# Patient Record
Sex: MALE | Race: WHITE | Employment: FULL TIME | URBAN - METROPOLITAN AREA
[De-identification: names, ages, dates, MRNs, and addresses within clinical notes are randomized per-mention and may not be internally consistent; named-entity substitution may affect disease eponyms.]

---

## 2018-12-17 ENCOUNTER — HOSPITAL ENCOUNTER (EMERGENCY)
Age: 28
Discharge: HOME | End: 2018-12-17
Payer: COMMERCIAL

## 2018-12-17 VITALS
RESPIRATION RATE: 16 BRPM | HEIGHT: 72 IN | TEMPERATURE: 97.9 F | SYSTOLIC BLOOD PRESSURE: 140 MMHG | BODY MASS INDEX: 27.09 KG/M2 | OXYGEN SATURATION: 99 % | DIASTOLIC BLOOD PRESSURE: 86 MMHG | HEART RATE: 87 BPM | WEIGHT: 200 LBS

## 2018-12-17 DIAGNOSIS — T15.92XA FOREIGN BODY OF LEFT EXTERNAL EYE, INITIAL ENCOUNTER: Primary | ICD-10-CM

## 2018-12-17 PROCEDURE — 6370000000 HC RX 637 (ALT 250 FOR IP): Performed by: PHYSICIAN ASSISTANT

## 2018-12-17 PROCEDURE — 90715 TDAP VACCINE 7 YRS/> IM: CPT | Performed by: PHYSICIAN ASSISTANT

## 2018-12-17 PROCEDURE — 99283 EMERGENCY DEPT VISIT LOW MDM: CPT

## 2018-12-17 PROCEDURE — 4500000027

## 2018-12-17 PROCEDURE — 6360000002 HC RX W HCPCS: Performed by: PHYSICIAN ASSISTANT

## 2018-12-17 PROCEDURE — 90471 IMMUNIZATION ADMIN: CPT | Performed by: PHYSICIAN ASSISTANT

## 2018-12-17 RX ORDER — ERYTHROMYCIN 5 MG/G
OINTMENT OPHTHALMIC
Qty: 1 TUBE | Refills: 0 | Status: SHIPPED | OUTPATIENT
Start: 2018-12-17

## 2018-12-17 RX ORDER — TETRACAINE HYDROCHLORIDE 5 MG/ML
2 SOLUTION OPHTHALMIC SEE ADMIN INSTRUCTIONS
Status: DISCONTINUED | OUTPATIENT
Start: 2018-12-17 | End: 2018-12-17 | Stop reason: HOSPADM

## 2018-12-17 RX ORDER — HYDROCODONE BITARTRATE AND ACETAMINOPHEN 5; 325 MG/1; MG/1
1 TABLET ORAL EVERY 4 HOURS PRN
Qty: 6 TABLET | Refills: 0 | Status: SHIPPED | OUTPATIENT
Start: 2018-12-17 | End: 2018-12-24

## 2018-12-17 RX ADMIN — TETANUS TOXOID, REDUCED DIPHTHERIA TOXOID AND ACELLULAR PERTUSSIS VACCINE, ADSORBED 0.5 ML: 5; 2.5; 8; 8; 2.5 SUSPENSION INTRAMUSCULAR at 18:06

## 2018-12-17 RX ADMIN — FLUORESCEIN SODIUM 1 MG: 1 STRIP OPHTHALMIC at 18:06

## 2018-12-17 RX ADMIN — TETRACAINE HYDROCHLORIDE 2 DROP: 25 LIQUID OPHTHALMIC at 18:06

## 2018-12-17 ASSESSMENT — VISUAL ACUITY
OU: 20/25
OD: 20/25
OS: 20/25

## 2018-12-17 NOTE — LETTER
[facility] Emergency Department  [facility] [address] [address]  Phone: [phone]  Fax: [phone]               December 17, 2018    Patient: Vita Ayala   YOB: 1990   Date of Visit: 12/17/2018       To Whom It May Concern:    Sarita Mc was seen and treated in our emergency department on 12/17/2018. He may return to work on 12/19/2018.       Sincerely,       Trudi Matute RN         Signature:__________________________________

## 2019-12-16 ENCOUNTER — HOSPITAL ENCOUNTER (EMERGENCY)
Age: 29
Discharge: HOME | End: 2019-12-16
Attending: EMERGENCY MEDICINE
Payer: COMMERCIAL

## 2019-12-16 VITALS
SYSTOLIC BLOOD PRESSURE: 142 MMHG | HEART RATE: 81 BPM | RESPIRATION RATE: 20 BRPM | WEIGHT: 196 LBS | BODY MASS INDEX: 26.55 KG/M2 | TEMPERATURE: 98.1 F | DIASTOLIC BLOOD PRESSURE: 84 MMHG | OXYGEN SATURATION: 98 % | HEIGHT: 72 IN

## 2019-12-16 DIAGNOSIS — G47.8 DIFFICULTY WAKING: Primary | ICD-10-CM

## 2019-12-16 LAB
ALBUMIN SERPL-MCNC: 4.1 GM/DL (ref 3.4–5)
ALP BLD-CCNC: 73 IU/L (ref 40–129)
ALT SERPL-CCNC: 13 U/L (ref 10–40)
ANION GAP SERPL CALCULATED.3IONS-SCNC: 10 MMOL/L (ref 4–16)
AST SERPL-CCNC: 18 IU/L (ref 15–37)
BASOPHILS ABSOLUTE: 0 K/CU MM
BASOPHILS RELATIVE PERCENT: 0.3 % (ref 0–1)
BILIRUB SERPL-MCNC: 0.3 MG/DL (ref 0–1)
BUN BLDV-MCNC: 11 MG/DL (ref 6–23)
CALCIUM SERPL-MCNC: 9.2 MG/DL (ref 8.3–10.6)
CHLORIDE BLD-SCNC: 104 MMOL/L (ref 99–110)
CO2: 26 MMOL/L (ref 21–32)
CREAT SERPL-MCNC: 0.8 MG/DL (ref 0.9–1.3)
DIFFERENTIAL TYPE: ABNORMAL
EOSINOPHILS ABSOLUTE: 0.2 K/CU MM
EOSINOPHILS RELATIVE PERCENT: 1.8 % (ref 0–3)
GFR AFRICAN AMERICAN: >60 ML/MIN/1.73M2
GFR NON-AFRICAN AMERICAN: >60 ML/MIN/1.73M2
GLUCOSE BLD-MCNC: 86 MG/DL (ref 70–99)
HCT VFR BLD CALC: 46.5 % (ref 42–52)
HEMOGLOBIN: 14.8 GM/DL (ref 13.5–18)
IMMATURE NEUTROPHIL %: 0.6 % (ref 0–0.43)
LIPASE: 30 IU/L (ref 13–60)
LYMPHOCYTES ABSOLUTE: 1.9 K/CU MM
LYMPHOCYTES RELATIVE PERCENT: 16 % (ref 24–44)
MCH RBC QN AUTO: 31.3 PG (ref 27–31)
MCHC RBC AUTO-ENTMCNC: 31.8 % (ref 32–36)
MCV RBC AUTO: 98.3 FL (ref 78–100)
MONOCYTES ABSOLUTE: 0.9 K/CU MM
MONOCYTES RELATIVE PERCENT: 7.8 % (ref 0–4)
NUCLEATED RBC %: 0 %
PDW BLD-RTO: 12.7 % (ref 11.7–14.9)
PLATELET # BLD: 197 K/CU MM (ref 140–440)
PMV BLD AUTO: 10.7 FL (ref 7.5–11.1)
POTASSIUM SERPL-SCNC: 4.4 MMOL/L (ref 3.5–5.1)
RBC # BLD: 4.73 M/CU MM (ref 4.6–6.2)
SEGMENTED NEUTROPHILS ABSOLUTE COUNT: 8.8 K/CU MM
SEGMENTED NEUTROPHILS RELATIVE PERCENT: 73.5 % (ref 36–66)
SODIUM BLD-SCNC: 140 MMOL/L (ref 135–145)
TOTAL IMMATURE NEUTOROPHIL: 0.07 K/CU MM
TOTAL NUCLEATED RBC: 0 K/CU MM
TOTAL PROTEIN: 7.3 GM/DL (ref 6.4–8.2)
WBC # BLD: 11.9 K/CU MM (ref 4–10.5)

## 2019-12-16 PROCEDURE — 99284 EMERGENCY DEPT VISIT MOD MDM: CPT

## 2019-12-16 PROCEDURE — 85025 COMPLETE CBC W/AUTO DIFF WBC: CPT

## 2019-12-16 PROCEDURE — 80053 COMPREHEN METABOLIC PANEL: CPT

## 2019-12-16 PROCEDURE — 83690 ASSAY OF LIPASE: CPT

## 2019-12-16 PROCEDURE — 36415 COLL VENOUS BLD VENIPUNCTURE: CPT

## 2019-12-16 ASSESSMENT — PAIN DESCRIPTION - LOCATION: LOCATION: FOOT

## 2019-12-16 ASSESSMENT — PAIN DESCRIPTION - DESCRIPTORS: DESCRIPTORS: ACHING

## 2019-12-16 ASSESSMENT — PAIN DESCRIPTION - PAIN TYPE: TYPE: ACUTE PAIN

## 2019-12-16 ASSESSMENT — PAIN DESCRIPTION - ORIENTATION: ORIENTATION: LEFT

## 2019-12-16 ASSESSMENT — PAIN SCALES - GENERAL: PAINLEVEL_OUTOF10: 3

## 2022-01-02 ENCOUNTER — HOSPITAL ENCOUNTER (EMERGENCY)
Age: 32
Discharge: HOME | End: 2022-01-02
Payer: COMMERCIAL

## 2022-01-02 VITALS
HEART RATE: 100 BPM | RESPIRATION RATE: 16 BRPM | TEMPERATURE: 98.1 F | OXYGEN SATURATION: 98 % | SYSTOLIC BLOOD PRESSURE: 136 MMHG | DIASTOLIC BLOOD PRESSURE: 85 MMHG

## 2022-01-02 DIAGNOSIS — R21 RASH AND OTHER NONSPECIFIC SKIN ERUPTION: Primary | ICD-10-CM

## 2022-01-02 PROCEDURE — 99283 EMERGENCY DEPT VISIT LOW MDM: CPT

## 2022-01-02 PROCEDURE — 6370000000 HC RX 637 (ALT 250 FOR IP): Performed by: PHYSICIAN ASSISTANT

## 2022-01-02 RX ORDER — PREDNISONE 50 MG/1
50 TABLET ORAL DAILY
Qty: 5 TABLET | Refills: 0 | Status: SHIPPED | OUTPATIENT
Start: 2022-01-02 | End: 2022-01-07

## 2022-01-02 RX ADMIN — PREDNISONE 50 MG: 20 TABLET ORAL at 19:56

## 2022-01-03 NOTE — ED PROVIDER NOTES
eMERGENCY dEPARTMENT eNCOUnter        [address] [facility]    Chief Complaint   Patient presents with    Urticaria     no relief with benadryl       HPI    Tom Mann is a 36 y.o. male who presents with a rash. Onset was 3 days ago. The duration has been constant. The quality is pruritic, mild burning. The location is generalized. No pet or plant exposures, medications, chemicals in household, sick contacts, cosmetics. Associated symptoms: none. Aggravating factors: none. REVIEW OF SYSTEMS    Pulmonary: No difficulty breathing or chest tightness  Skin: + rash  ENT: No throat swelling or tongue swelling  General: No fevers or syncope    PAST MEDICAL & SURGICAL HISTORY    Past Medical History:   Diagnosis Date    Anxiety     Seizures ([address] 03/2014     Past Surgical History:   Procedure Laterality Date    APPENDECTOMY         CURRENT MEDICATIONS    Current Outpatient Rx   Medication Sig Dispense Refill    predniSONE (DELTASONE) 50 MG tablet Take 1 tablet by mouth daily for 5 days 5 tablet 0    erythromycin (ROMYCIN) 5 MG/GM ophthalmic ointment Apply 1 cm ribbon to the lower lid every 6 hours for 7 days 1 Tube 0    dicyclomine (BENTYL) 10 MG capsule Take 1 capsule by mouth 3 times daily As needed for abdominal pain 15 capsule 0    Magic Mouthwash (MIRACLE MOUTHWASH) Swish and spit 10 mLs 4 times daily as needed for Dental Pain Lidocaine, Maalox, Benadryl 240 mL 0       ALLERGIES    Allergies   Allergen Reactions    Sulfur Hives     Pt. States he just gets hives.        SOCIAL & FAMILY HISTORY    Social History     Socioeconomic History    Marital status: Single     Spouse name: None    Number of children: None    Years of education: None    Highest education level: None   Occupational History    None   Tobacco Use    Smoking status: Current Every Day Smoker     Packs/day: 1.00     Types: Cigarettes    Smokeless tobacco: Never Used   Substance and Sexual Activity    Alcohol use: No    Drug use: No    Sexual activity: None   Other Topics Concern    None   Social History Narrative    None     Social Determinants of Health     Financial Resource Strain:     Difficulty of Paying Living Expenses: Not on file   Food Insecurity:     Worried About Running Out of Food in the Last Year: Not on file    Kanu of Food in the Last Year: Not on file   Transportation Needs:     Lack of Transportation (Medical): Not on file    Lack of Transportation (Non-Medical): Not on file   Physical Activity:     Days of Exercise per Week: Not on file    Minutes of Exercise per Session: Not on file   Stress:     Feeling of Stress : Not on file   Social Connections:     Frequency of Communication with Friends and Family: Not on file    Frequency of Social Gatherings with Friends and Family: Not on file    Attends [employer] Services: Not on file    Active Member of [address] [place] [address] or Organizations: Not on file    Attends Club or Organization Meetings: Not on file    Marital Status: Not on file   Intimate Partner Violence:     Fear of Current or Ex-Partner: Not on file    Emotionally Abused: Not on file    Physically Abused: Not on file    Sexually Abused: Not on file   Housing Stability:     Unable to Pay for Housing in the Last Year: Not on file    Number of [place] in the Last Year: Not on file    Unstable Housing in the Last Year: Not on file     History reviewed. No pertinent family history. PHYSICAL EXAM    VITAL SIGNS: /85   Pulse 100   Temp 98.1 °F (36.7 °C) (Oral)   Resp 16   SpO2 98%   Constitutional:  Well developed, well nourished, no acute distress  HENT:  Atraumatic, no facial or lip swelling  ORAL EXAM:  No tongue swelling, airway patent  NECK:  Supple, No neck swelling  Respiratory:  No respiratory distress, normal breath sounds   Cardiovascular:  No JVD   GI: nondistended, normal bowel sounds, nontender, no organomegaly   Musculoskeletal:  No edema, no acute deformities. Integument: Patient has raised papular rash with occasional wheals concerning for allergic reaction. It spares the palms and the mucous membranes and most of the face but does involve around the bilateral cheeks and lid margins. There are no petechiae, pustules, purpura, induration, desquamation, bullae or discharge. ED COURSE & MEDICAL DECISION MAKING    Pertinent Labs reviewed and interpreted. (See chart for details)  See chart for details of medications given during the ED stay. Vitals:    01/02/22 1940   BP: 136/85   Pulse: 100   Resp: 16   Temp: 98.1 °F (36.7 °C)   TempSrc: Oral   SpO2: 98%     Patient has rash concerning for allergic reaction. It is quite pruritic. No clear source identified. Will start on prednisone. Recommended follow-up with PCP for recheck. I estimate there is LOW risk for SEVERE CELLULITIS, DEEP SPACE ABSCESS OR INFECTION, COMPARTMENT SYNDROME, NECROTIZING FASCIITIS, CLAIR'S GANGRENE, SOFT TISSUE FOREIGN BODY, OSTEOMYELITIS, thus I consider the discharge disposition reasonable. Nenita Pace and I have discussed the diagnosis and risks, and we agree with discharging home to follow-up with their primary doctor. We also discussed returning to the Emergency Department immediately if new or worsening symptoms occur. We have discussed the symptoms which are most concerning (e.g., changing or worsening pain, fever, numbness, weakness, cool or painful digits) that necessitate immediate return. I have independently evaluated this patient. Differential diagnosis: Vasculitis, bacterial skin infection, viral rash, systemic infectious rash, Anaphylaxis, Urticaria, other    FINAL IMPRESSION    Rash          (Please note that this note was completed with a voice recognition program.  Every attempt was made to edit the dictations, but inevitably there remain words that are mis-transcribed. Graham Keyes PA-C  01/02/22 1945

## 2022-03-17 ENCOUNTER — APPOINTMENT (OUTPATIENT)
Dept: CT IMAGING | Age: 32
End: 2022-03-17
Payer: COMMERCIAL

## 2022-03-17 ENCOUNTER — HOSPITAL ENCOUNTER (EMERGENCY)
Age: 32
Discharge: HOME | End: 2022-03-18
Payer: COMMERCIAL

## 2022-03-17 ENCOUNTER — APPOINTMENT (OUTPATIENT)
Dept: GENERAL RADIOLOGY | Age: 32
End: 2022-03-17
Payer: COMMERCIAL

## 2022-03-17 DIAGNOSIS — S82.392A OTHER CLOSED FRACTURE OF DISTAL END OF LEFT TIBIA, INITIAL ENCOUNTER: Primary | ICD-10-CM

## 2022-03-17 PROCEDURE — 6370000000 HC RX 637 (ALT 250 FOR IP): Performed by: PHYSICIAN ASSISTANT

## 2022-03-17 PROCEDURE — 96372 THER/PROPH/DIAG INJ SC/IM: CPT

## 2022-03-17 PROCEDURE — 6360000002 HC RX W HCPCS: Performed by: PHYSICIAN ASSISTANT

## 2022-03-17 PROCEDURE — 73564 X-RAY EXAM KNEE 4 OR MORE: CPT

## 2022-03-17 PROCEDURE — 99285 EMERGENCY DEPT VISIT HI MDM: CPT

## 2022-03-17 RX ORDER — FENTANYL CITRATE 50 UG/ML
50 INJECTION, SOLUTION INTRAMUSCULAR; INTRAVENOUS ONCE
Status: COMPLETED | OUTPATIENT
Start: 2022-03-17 | End: 2022-03-17

## 2022-03-17 RX ORDER — IBUPROFEN 600 MG/1
600 TABLET ORAL ONCE
Status: COMPLETED | OUTPATIENT
Start: 2022-03-17 | End: 2022-03-17

## 2022-03-17 RX ORDER — MORPHINE SULFATE 2 MG/ML
4 INJECTION, SOLUTION INTRAMUSCULAR; INTRAVENOUS EVERY 30 MIN PRN
Status: DISCONTINUED | OUTPATIENT
Start: 2022-03-17 | End: 2022-03-18 | Stop reason: HOSPADM

## 2022-03-17 RX ORDER — HYDROCODONE BITARTRATE AND ACETAMINOPHEN 5; 325 MG/1; MG/1
1 TABLET ORAL ONCE
Status: COMPLETED | OUTPATIENT
Start: 2022-03-17 | End: 2022-03-17

## 2022-03-17 RX ADMIN — FENTANYL CITRATE 50 MCG: 50 INJECTION INTRAMUSCULAR; INTRAVENOUS at 21:34

## 2022-03-17 RX ADMIN — HYDROCODONE BITARTRATE AND ACETAMINOPHEN 1 TABLET: 5; 325 TABLET ORAL at 21:34

## 2022-03-17 RX ADMIN — IBUPROFEN 600 MG: 600 TABLET, FILM COATED ORAL at 21:34

## 2022-03-17 RX ADMIN — MORPHINE SULFATE 4 MG: 2 INJECTION, SOLUTION INTRAMUSCULAR; INTRAVENOUS at 23:47

## 2022-03-17 ASSESSMENT — PAIN SCALES - GENERAL
PAINLEVEL_OUTOF10: 10

## 2022-03-17 ASSESSMENT — PAIN DESCRIPTION - ORIENTATION: ORIENTATION: LEFT

## 2022-03-17 ASSESSMENT — PAIN DESCRIPTION - ONSET: ONSET: SUDDEN

## 2022-03-17 ASSESSMENT — PAIN DESCRIPTION - PAIN TYPE: TYPE: ACUTE PAIN

## 2022-03-17 ASSESSMENT — PAIN DESCRIPTION - DESCRIPTORS: DESCRIPTORS: THROBBING

## 2022-03-17 ASSESSMENT — PAIN DESCRIPTION - FREQUENCY: FREQUENCY: CONTINUOUS

## 2022-03-17 ASSESSMENT — PAIN DESCRIPTION - LOCATION: LOCATION: KNEE

## 2022-03-17 ASSESSMENT — PAIN - FUNCTIONAL ASSESSMENT: PAIN_FUNCTIONAL_ASSESSMENT: 0-10

## 2022-03-17 NOTE — LETTER
[facility] Emergency Department  [address] [address]  Phone: [phone]  Fax: [phone]               March 18, 2022    Patient: Yanira Shaver   YOB: 1990   Date of Visit: 3/17/2022       To Whom It May Concern:    Carmelina Hopson was seen and treated in our emergency department on 3/17/2022. He may return to work on 3/22/2022 & when further evaluated by orthopedics.  .      Sincerely,       Nurse        Signature:__________________________________

## 2022-03-18 ENCOUNTER — APPOINTMENT (OUTPATIENT)
Dept: CT IMAGING | Age: 32
End: 2022-03-18
Payer: COMMERCIAL

## 2022-03-18 VITALS
RESPIRATION RATE: 18 BRPM | OXYGEN SATURATION: 95 % | SYSTOLIC BLOOD PRESSURE: 127 MMHG | HEART RATE: 70 BPM | WEIGHT: 228 LBS | DIASTOLIC BLOOD PRESSURE: 67 MMHG | HEIGHT: 72 IN | BODY MASS INDEX: 30.88 KG/M2 | TEMPERATURE: 98 F

## 2022-03-18 PROCEDURE — 73700 CT LOWER EXTREMITY W/O DYE: CPT

## 2022-03-18 PROCEDURE — 6370000000 HC RX 637 (ALT 250 FOR IP): Performed by: PHYSICIAN ASSISTANT

## 2022-03-18 RX ORDER — OXYCODONE HYDROCHLORIDE AND ACETAMINOPHEN 5; 325 MG/1; MG/1
1-2 TABLET ORAL EVERY 4 HOURS PRN
Qty: 15 TABLET | Refills: 0 | Status: SHIPPED | OUTPATIENT
Start: 2022-03-18 | End: 2022-03-21

## 2022-03-18 RX ORDER — OXYCODONE HYDROCHLORIDE AND ACETAMINOPHEN 5; 325 MG/1; MG/1
1 TABLET ORAL ONCE
Status: COMPLETED | OUTPATIENT
Start: 2022-03-18 | End: 2022-03-18

## 2022-03-18 RX ORDER — IBUPROFEN 600 MG/1
600 TABLET ORAL EVERY 6 HOURS PRN
Qty: 20 TABLET | Refills: 0 | Status: SHIPPED | OUTPATIENT
Start: 2022-03-18 | End: 2022-04-17

## 2022-03-18 RX ADMIN — OXYCODONE HYDROCHLORIDE AND ACETAMINOPHEN 1 TABLET: 5; 325 TABLET ORAL at 01:09

## 2022-03-18 ASSESSMENT — PAIN SCALES - GENERAL: PAINLEVEL_OUTOF10: 9

## 2022-03-18 NOTE — ED NOTES
Pt. reviewed discharge instructions, follow up instructions, and new medications. Pt.  given printed prescriptions & aware of medications sent to pharmacy. Pt. verbalizes understanding with no further questions. Pt. Wheeled out to lobby and not showing any signs of distress.        Jadyn Garcia RN  03/18/22 2063

## 2022-03-18 NOTE — ED NOTES
Patient to ED with reports of injury to L knee [place]. Pain 10/10. Alert and oriented.       Ula Kussmaul, RN  03/17/22 2042

## 2022-03-18 NOTE — ED PROVIDER NOTES
Triage Chief Complaint:   Knee Injury (jumped off swing and injured L knee)    [place]:  Today in the ED I had the pleasure of caring for 6[address] Kalyan Sneed who is a 36 y.o. male that presents today to the emergency department complaining of left-sided knee pain. Context is patient was on a swing. He jumped off the swing and landed on his left knee and had instant pain. He has had decreased ability to ambulate since. No head trauma. Did not fall or syncopized. No associated paresthesias. .  9/10. ROS:  REVIEW OF SYSTEMS    At least 10 systems reviewed      All other review of systems are negative  See HPI and nursing notes for additional information       Past Medical History:   Diagnosis Date    Anxiety     [address] [address] 03/2014     Past Surgical History:   Procedure Laterality Date    APPENDECTOMY       History reviewed. No pertinent family history. Social History     Socioeconomic History    Marital status: Single     Spouse name: Not on file    Number of children: Not on file    Years of education: Not on file    Highest education level: Not on file   Occupational History    Not on file   Tobacco Use    Smoking status: Current Every Day Smoker     Packs/day: 1.00     Types: Cigarettes    Smokeless tobacco: Never Used   Substance and Sexual Activity    Alcohol use: No    Drug use: No    Sexual activity: Not on file   Other Topics Concern    Not on file   Social History Narrative    Not on file     Social Determinants of Health     Financial Resource Strain:     Difficulty of Paying Living Expenses: Not on file   Food Insecurity:     Worried About Running Out of Food in the Last Year: Not on file    Kanu of Food in the Last Year: Not on file   Transportation Needs:     Lack of Transportation (Medical): Not on file    Lack of Transportation (Non-Medical):  Not on file   Physical Activity:     Days of Exercise per Week: Not on file    Minutes of Exercise per Session: Not on file   Stress: Triage Vitals [03/17/22 2043]   Enc Vitals Group      BP (!) 114/44      Pulse 80      Resp 24      Temp 97.7 °F (36.5 °C)      Temp Source Oral      SpO2 97 %      Weight 228 lb (103.4 kg)      Height 6' (1.829 m)      Head Circumference       Peak Flow       Pain Score       Pain Loc       Pain Edu? Excl. in [address]? General :Patient is awake alert oriented person place and time no acute distress nontoxic appearing  HEENT: Pupils are equally round and reactive to light extraocular motors are intact conjunctivae clear sclerae white there is no injection no icterus. Nose without any rhinorrhea or epistaxis. Oral mucosa is moist no exudate buccal mucosa shows no ulcerations. Uvula is midline    Neck: Neck is supple full range of motion trachea midline thyroid nonpalpable  Cardiac: Heart regular rate rhythm no murmurs rubs clicks or gallops  Lungs: Lungs are clear to auscultation there is no wheezing rhonchi or rales. There is no use of accessory muscles no nasal flaring identified. Chest wall: There is no tenderness to palpation over the chest wall or over ribs  Abdomen: Abdomen is soft nontender nondistended. There is no firm or pulsatile masses no rebound rigidity or guarding negative Crespo's negative McBurney, no peritoneal signs  Suprapubic:  there is no tenderness to palpation over the external bladder   Musculoskeletal: Suprapatellar effusion is noted left-sided. There is no obvious deformity compartments proximally and distally are soft. Calves are soft no palpable cords visible varicosities DP/PT pulse 2+. Sensation is intact. Range of motion of ipsilateral ankle in all directions full. All toes in all directions full. Range of motion of the knee is stable neck particularly limited secondary to pain. In a semiflexed position. No breaks in skin. No tenting of the skin.   Dermatology: Skin is warm and dry there is no obvious abscesses lacerations or lesions noted  Psych: Mentation is grossly normal cognition is grossly normal. Affect is appropriate  Neuro: Motor intact sensory intact cranial nerves II through XII are intact level of consciousness is normal cerebellar function is normal reflexes are grossly normal. No evidence of incontinence or loss of bowel or bladder no saddle anesthesia noted Lymphatic: There is no submandibular or cervical adenopathy appreciated. I have reviewed and interpreted all of the currently available lab results from this visit (if applicable):  No results found for this visit on 03/17/22. Radiographs (if obtained):  [] The following radiograph was interpreted by myself in the absence of a radiologist:   [] Radiologist's Report Reviewed:  XR KNEE LEFT (MIN 4 VIEWS)   Preliminary Result   1. Acute, comminuted, mildly displaced and impacted intra-articular fracture   of the proximal tibia. 2. Nondisplaced fracture of the fibular head. CT FEMUR LEFT WO CONTRAST    (Results Pending)   CT TIBIA FIBULA LEFT WO CONTRAST    (Results Pending)       EKG (if obtained):   Please See Note of attending physician for EKG interpretation. Chart review shows recent radiograph(s):  No results found. MDM:     Interventions given this visit:   Orders Placed This Encounter   Medications    fentaNYL (SUBLIMAZE) injection 50 mcg    HYDROcodone-acetaminophen (NORCO) 5-325 MG per tablet 1 tablet    ibuprofen (ADVIL;MOTRIN) tablet 600 mg    morphine injection 4 mg    oxyCODONE-acetaminophen (PERCOCET) 5-325 MG per tablet     Sig: Take 1-2 tablets by mouth every 4 hours as needed for Pain for up to 3 days. Dispense:  15 tablet     Refill:  0    ibuprofen (IBU) 600 MG tablet     Sig: Take 1 tablet by mouth every 6 hours as needed for Pain     Dispense:  20 tablet     Refill:  0    oxyCODONE-acetaminophen (PERCOCET) 5-325 MG per tablet 1 tablet   Vascularly intact patient presents today to the emergency department for fracture. X-ray reveals fracture.   Follow-up CT

## 2022-03-21 ENCOUNTER — OFFICE VISIT (OUTPATIENT)
Dept: ORTHOPEDIC SURGERY | Age: 32
End: 2022-03-21
Payer: COMMERCIAL

## 2022-03-21 VITALS
SYSTOLIC BLOOD PRESSURE: 148 MMHG | WEIGHT: 228 LBS | BODY MASS INDEX: 30.88 KG/M2 | HEART RATE: 98 BPM | OXYGEN SATURATION: 99 % | DIASTOLIC BLOOD PRESSURE: 92 MMHG | RESPIRATION RATE: 15 BRPM | HEIGHT: 72 IN

## 2022-03-21 DIAGNOSIS — S82.192A OTHER CLOSED FRACTURE OF PROXIMAL END OF LEFT TIBIA, INITIAL ENCOUNTER: Primary | ICD-10-CM

## 2022-03-21 PROCEDURE — 99203 OFFICE O/P NEW LOW 30 MIN: CPT | Performed by: ORTHOPAEDIC SURGERY

## 2022-03-21 NOTE — PATIENT INSTRUCTIONS
Continue to be non-weightbearing on the left leg  continue to wear your knee brace at all times  Continue range of motion exercises as instructed. Ice and elevate as needed.   continue to take your pain medication     Referral to [facility] orthopedic

## 2022-03-21 NOTE — PROGRESS NOTES
Patient presents to the office today for ED FU of the proximal tibial fx DOI 3/17/22. Pt states he was swinging and jumped out of the swing and landed on his left foot. Pt states he instantly felt pain in the left knee and foot. Pt states his knee gave away. Pt states he does have increase pain with any movement of the left knee. He has been wearing his knee brace since the hospital. Pt states he has been taking percocet which does not help.

## 2022-03-22 ASSESSMENT — ENCOUNTER SYMPTOMS
BACK PAIN: 0
COLOR CHANGE: 0
ABDOMINAL PAIN: 0
EYE REDNESS: 0
CHEST TIGHTNESS: 0

## 2022-03-22 NOTE — PROGRESS NOTES
Subjective:      Patient ID: Maria G Bernard is a 36 y.o. male. Patient presents to the office today for ED FU of the proximal tibial fx DOI 3/17/22. Pt states he was swinging and jumped out of the swing and landed on his left foot. Pt states he instantly felt pain in the left knee and foot. Pt states his knee gave away. Pt states he does have increase pain with any movement of the left knee. He has been wearing his knee brace since the hospital. Pt states he has been taking percocet which does not help. He comes in today for his first visit with me in regards to his left leg injury. He states that since the injury he continues complaining of deep, aching and throbbing pain globally in his left knee and lower leg. Patient denies any prior injury to the involved extremity/ joint, denies numbness or tingling in the involved extremity and denies fever or chills. Review of Systems   Constitutional: Negative for activity change, chills and fever. HENT: Negative for congestion and drooling. Eyes: Negative for redness. Respiratory: Negative for chest tightness. Cardiovascular: Negative for chest pain. Gastrointestinal: Negative for abdominal pain. Endocrine: Negative for cold intolerance and heat intolerance. Musculoskeletal: Positive for arthralgias, gait problem, joint swelling and myalgias. Negative for back pain. Skin: Negative for color change, pallor, rash and wound. Neurological: Negative for weakness and numbness. Psychiatric/Behavioral: Negative for confusion. Past Medical History:   Diagnosis Date    Anxiety     [facility]       Objective:   Physical Exam  Constitutional:       Appearance: He is well-developed. HENT:      Head: Normocephalic. Eyes:      Pupils: Pupils are equal, round, and reactive to light. Pulmonary:      Effort: Pulmonary effort is normal.   Musculoskeletal:         General: Swelling, tenderness and signs of injury present.  No deformity. Cervical back: Normal range of motion. Right hip: Normal.      Left hip: Normal.      Right knee: No swelling, deformity, effusion, erythema, ecchymosis, lacerations or bony tenderness. Normal range of motion. No tenderness. No medial joint line, lateral joint line, MCL, LCL or patellar tendon tenderness. No LCL laxity or MCL laxity. Normal alignment and normal patellar mobility. Left knee: Swelling, effusion and bony tenderness present. No deformity, erythema, ecchymosis or lacerations. Decreased range of motion. Tenderness present over the medial joint line, lateral joint line and patellar tendon. No MCL or LCL tenderness. Normal alignment and normal patellar mobility. Skin:     General: Skin is warm and dry. Capillary Refill: Capillary refill takes less than 2 seconds. Coloration: Skin is not pale. Findings: Bruising present. No erythema or rash. Neurological:      Mental Status: He is alert and oriented to person, place, and time. Left knee-skin intact, moderate swelling, moderate ecchymosis, no blisters present. Full range of motion not assessed due to recent injury. Intact sensation motor function to all nerve distributions of the extremity. +2 DP  Compartment soft. Assessment:      Left bicondylar tibial plateau fracture with metaphyseal diaphyseal dissociation, [place] 6. Plan:      I discussed with him today his x-ray findings. I explained to him that he does have a comminuted and displaced fracture of his proximal tibia which will require surgical treatment.   I explained to him that given the involvement of this fracture, I recommend a higher level of care with a trauma specialist at [place].  I recommend he leave the office today and go to the ED at [place], we did contact them for their recommendations and this was what they recommend he do in order to get in touch with the orthopedic traumatologist down there.  He agrees and will go to [facility] after his office visit. Continue knee immobilizer. Remain strictly nonweightbearing left lower extremity. Ice and elevate as needed. Follow-up with me will be as needed.         Anne-Marie Mcknight, DO

## 2024-06-13 ENCOUNTER — NURSE ONLY (OUTPATIENT)
Dept: OBGYN | Age: 34
End: 2024-06-13

## 2024-06-13 DIAGNOSIS — Z31.440 ENCOUNTER OF MALE FOR TESTING FOR GENETIC DISEASE CARRIER STATUS FOR PROCREATIVE MANAGEMENT: ICD-10-CM

## 2024-06-24 LAB
Lab: NEGATIVE
Lab: NORMAL
NTRA ALPHA-THALASSEMIA: NEGATIVE
NTRA BETA-HEMOGLOBINOPATHIES: NEGATIVE
NTRA CANAVAN DISEASE: NEGATIVE
NTRA CYSTIC FIBROSIS: NEGATIVE
NTRA FAMILIAL DYSAUTONOMIA: NEGATIVE
NTRA GALACTOSEMIA: NEGATIVE
NTRA GAUCHER DISEASE: NEGATIVE
NTRA MEDIUM CHAIN ACYL-COA DEHYDROGENASE DEFICIENCY: NEGATIVE
NTRA POLYCYSTIC KIDNEY DISEASE, AUTOSOMAL RECESSIVE: NEGATIVE
NTRA SMITH-LEMLI-OPITZ SYNDROME: NEGATIVE
NTRA SPINAL MUSCULAR ATROPHY: NEGATIVE
NTRA TAY-SACHS DISEASE: NEGATIVE